# Patient Record
(demographics unavailable — no encounter records)

---

## 2024-10-07 NOTE — PHYSICAL EXAM
[Effusion] : effusion [Tracheostomy __ (size and type)] : tracheostomy [unfilled] [Normal] : no cervical lymphadenopathy [Increased Work of Breathing] : no increased work of breathing with use of accessory muscles and retractions [Normal Gait and Station] : abnormal gait and station [Normal muscle strength, symmetry and tone of facial, head and neck musculature] : abnormal muscle strength, symmetry and tone of facial, head and neck musculature [de-identified] : 5.0 Flextend Bivona uncuffed in place [de-identified] : wheelchair

## 2024-10-07 NOTE — PROCEDURE
[FreeTextEntry1] : Fiberoptic tracheoscopy  [FreeTextEntry2] : tracheostomy complication  [FreeTextEntry3] : distal tip sitting slightly posteriorly in airway.  No evidence of significant granulation or obstruction. Otherwise clear to venancio with some secretions.

## 2024-10-07 NOTE — REASON FOR VISIT
[Subsequent Evaluation] : a subsequent evaluation for [Patient] : patient [Mother] : mother [FreeTextEntry2] : tracheostomy related complications

## 2024-10-07 NOTE — HISTORY OF PRESENT ILLNESS
[No change in the review of systems as noted in prior visit date ___] : No change in the review of systems as noted in prior visit date of [unfilled] [de-identified] : 26 year old with hx of tracheostomy and ventilator dependence. 5.0 Pediatric Flextend uncuffed with a custom length of 55mm  Ciprodex once a day  Pulmicort BID  Doing well until our last visit  Symptoms started last week - SOB, more tired, Oxygen saturation 98% with and without o2 More secretions - multiple trach changes due to difficulty breathing.  Family trialed the 60mm trach tube but he was unable to speak and still felt uncomfortable  increased Ciprodex to BID 3 days ago.   Here today for evaluation

## 2024-10-11 NOTE — REASON FOR VISIT
[Follow-Up] : a follow-up visit [Shortness of Breath] : shortness of breath [Parent] : parent [TextBox_44] : Chronic Respiratory Failure

## 2024-10-11 NOTE — REVIEW OF SYSTEMS
[Cough] : cough [Dyspnea] : dyspnea [Wheezing] : wheezing [Edema] : edema [Focal Weakness] : focal weakness [Fever] : no fever [Fatigue] : no fatigue [Recent Wt Gain (___ Lbs)] : ~T no recent weight gain [Chills] : no chills [Epistaxis] : no epistaxis [Hemoptysis] : no hemoptysis [Chest Tightness] : no chest tightness [Frequent URIs] : no frequent URIs [Sputum] : no sputum [Pleuritic Pain] : no pleuritic pain [Chest Discomfort] : no chest discomfort [Leg Cramps] : no leg cramps [Palpitations] : no palpitations [Phlebitis] : no phlebitis [Watery Eyes] : no watery eyes [Itchy Eyes] : no itchy eyes [Angioedema] : no angioedema [Abdominal Pain] : no abdominal pain [Nausea] : no nausea [Vomiting] : no vomiting [Arthralgias] : no arthralgias [Myalgias] : no myalgias [Itch] : no itch [Dizziness] : no dizziness [Numbness] : no numbness [Anxiety] : no anxiety [Panic Attacks] : no panic attacks [Thyroid Problem] : no thyroid problem [TextBox_14] : Tracheostomy on vent [TextBox_30] : intermittent wheezing when laying down

## 2024-10-11 NOTE — HISTORY OF PRESENT ILLNESS
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  - Noted to be more SOB particularly when laying flat or leaning forward - mother has had to Ambu him a few times. She has also noted his dyspnea worsens with stress (particularly when she tells him she has to travel out of the country) - Recently saw Dr. Hutchison (ENT) and started on antibiotics and steroids for short course - Tracheoscopy did not reveal significant airway abnormality - being planned for custom trach and considered for more in-depth airway evaluation - Remains on mechanical ventilation with LTV vent but will likely need a new ventilator as this is being discontinued per patient's mother. They are requesting a prescription for a new ventilator (and a ResMed Astral if covered by insurance). They have not yet gotten this and hard copy of prescription given to patient during office visit today - Continue Budesonide and Ciprodex as per ENT for granulation tissue - Has not been using Albuterol despite noting dyspne and wheezing - No reported tracheal bleeding - Remains on full mechanical ventilation 24/300/9 - SHOULD REMAIN ON THESE SETTINGS - Unable to obtain ABG at prior visit and patient has deferred repeat attempts - This summer was [place]ING [place] - with his ventilator on a surfboard - Has home O2 but uses irregularly - saturations are good without reported desaturation events - Has a chest vest/wrap which they are using to help clear secretions - He is able to talk over the vent  From prior visit: He is mentating at baseline. He denies fevers or chills. He just finds it harder to breathe from time to time. He spends most of the day in his wheelchair. 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 He is able to talk over the ventilator without difficulty.   He follows with Peds ENT (Dr. Catarino Hutchison) due to tracheostomy irritation. He has a 5.0 cm custom tracheostomy tube of 60 mm in length.   He has a long cardiac history as well including MVP with moderate MR and partial anomalous pulmonary vein return (PAPVR) with a dilated left ventricle and mild reduction in LV function. He is due for cardiology followup and echocardiogram  Patient now may be the oldest living patients with Thanatrophic dysplasia. [de-identified] : Dyspnea [FreeTextEntry4] : Vance will continue [facility] discussions with his family. He stated in our office visit today (8/12/22) with his mother and cousin present that he would rather die on the street than go to the ED. He was somewhat joking but was indicating that he would like to avoid hospitalization at all costs.

## 2024-10-11 NOTE — PHYSICAL EXAM
[No Acute Distress] : no acute distress [Well Groomed] : well groomed [Supple] : supple [Normal Rate/Rhythm] : normal rate/rhythm [Normal Pulses] : normal pulses [Murmur ___ / 6] : murmur [unfilled] / 6 [No Resp Distress] : no resp distress [Clear to Auscultation Bilaterally] : clear to auscultation bilaterally [Scoliosis] : scoliosis [Benign] : benign [Soft] : soft [Deformity] : deformity [No Clubbing] : no clubbing [No Cyanosis] : no cyanosis [Normal Color/ Pigmentation] : normal color/ pigmentation [Oriented x3] : oriented x3 [Normal Mood] : normal mood [Normal Affect] : normal affect [No Acc Muscle Use] : no acc muscle use [TextBox_2] : Thanatrophic Dysplasia - wheelchair and vent dependent [TextBox_11] : tracheostomy in place, anicteric, EOMI, voice stable [TextBox_44] : no irritation around tracheostomy site [TextBox_54] : holosystolic murmur [TextBox_68] : no wheeze noted on exam today [TextBox_89] : abdomen soft but distended, +BS, no TTP  [TextBox_99] : Thanatrophic dysplagia - with associated dysmorphia/changes

## 2024-10-11 NOTE — ASSESSMENT
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   Vance may be the oldest living person with Thanotrophic dysplasia. Over the last few months he has had increasing shortness of breath of unclear etiology.  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 - recommend more consistent use of bronchodilators if wheezing or dyspnea - continue airway clearance with chest vest/wrap as able and as tolerated - ensure regular BM to avoid constipation - Presently euvolemic appearing - If Dr. Hutchison is unable to perform airway evaluation then will refer patient to adult IP for further investigations. I have sent Dr. Hutchison a message to discuss this further should it be needed.  #Chronic Respiratory Failure - requiring chronic mechanical ventilation. Patient deferred ABG attempt - Patient now on supplemental o2 via vent (2L) - Continue current vent settings with tidal volume 300cc and PEEP 9 - his Vent settings SHOULD NOT BE CHANGED, particularly not decreased, without ABG or clinical reasoning which was done in the past for unknown reason. Patient is requesting [facility] vent to [facility]. Order placed - Maintain O2 sat > 90%  - Discussed with patient the dangers associated with [place] his ventilator [place] the ocean - however he has indicated these are things that he wants to do understanding the risks. He is aware of the possibility of severe respiratory distress and/or death should the ventilator become dislodged, non-functional, or he have a serious aspiration event.  #Health Maintenance Spirometry: Unable to complete Smoking status: Never smoker  Pneumococcal vaccination status: Up to date [place] Sleepiness Scale: 0 (9/13/21) - patient on chronic vent   #Followup in 1-2 months or sooner as needed - Today's medical care services serve as the continuing focal point for needed health care services that are part of ongoing care related to a patient's one or more serious conditions or a complex condition. - Outpatient ventilator management ([facility] - new ventilator prescription provided again today) - Youtube Video with Vance's story: [email or web address]   I have discussed with Vance, and his mother, that a lot of what we are dealing with in regards to the potential progression of underlying lung disease related to his skeletal dysplasia is unchartered territory due to the rarity of this disease process. However, as Vance is awake, talking, and in good spirits on the ventilator we will continue the current treatment approaches with hopes for continued stability. We have discussed that if his clinical state acutely worsens then my recommendation would be evaluation in the hospital to allow for more acute management and treatment. Currently, Vance feels well and hospital evaluation and stay are not in line with what he wants given his current feelings. I have asked them to call my office immediately if any changes in his clinical status so that we can try and help however possible - whether requiring repeat imaging for adjustments of his ventilator  The above plan was discussed with Vance and his mother in detail. Patient verbalized understanding and agrees with plan as detailed above. Patient was provided education and counselling on current diagnosis/symptoms, diagnostic work up, treatment options and potential side effects of any prescribed therapy/therapies. They were advised to call our clinic at [phone] for any new or worsening symptoms, or with any questions or concerns. In case of acute onset of respiratory symptoms or worsening presentation, patient was advised to present to nearest emergency room for further evaluation. Vance expressed understanding and all questions/concerns were addressed.

## 2024-10-21 NOTE — PROCEDURE
[FreeTextEntry1] : Fiberoptic tracheoscopy and trach change  [FreeTextEntry2] : tracheostomy complication  [FreeTextEntry3] : Distal tip sits posteriorly to tracheal wall  No evidence of granulation tissue  Secretions at venancio- some erythema in trachea Distal tip is partially obstructive and otherwise clear to venancio   Tracheostomy changed by mother without difficulty 5.0 custom flextend at 58 mm length  Mild improvement at the distal tip - no evidence of granulation Patient was still able to vocalize

## 2024-10-21 NOTE — HISTORY OF PRESENT ILLNESS
[No change in the review of systems as noted in prior visit date ___] : No change in the review of systems as noted in prior visit date of [unfilled] [de-identified] : 26 year old with hx of tracheostomy and ventilator dependence. 5.0 Pediatric Flextend uncuffed with a custom length of 55mm  Ciprodex BID Pulmicort BID Treated with Augmentin and steroids for 5 days   Seen by Pulm  and CT ordered  Patient states to feel better but has pain when trach manipulated  Mother reports that less incidences of needing to use BVM Patients reports airleak at stoma site   Custom length of 58 ordered  Seen by cardio 6/2024   c/o of abdominal distention and severe constipation- No GI following

## 2024-10-21 NOTE — PHYSICAL EXAM
[Tracheostomy __ (size and type)] : tracheostomy [unfilled] [No Breakdown] : no evidence of breakdown or excoriation at stoma site [Normal] : no cervical lymphadenopathy [Exposed Vessel] : left anterior vessel not exposed [Increased Work of Breathing] : no increased work of breathing with use of accessory muscles and retractions [Normal Gait and Station] : abnormal gait and station [Normal muscle strength, symmetry and tone of facial, head and neck musculature] : abnormal muscle strength, symmetry and tone of facial, head and neck musculature [de-identified] : wheelchair, verbal and oriented  [de-identified] : 5.0  Peds Flextend 55mm

## 2024-10-23 NOTE — CARDIOLOGY SUMMARY
[Today's Date] : [unfilled] [FreeTextEntry1] : Low voltage EKG, normal sinus rhythm. Nonspecific T wave abnormality.  QTc 359. 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 Difficult to interpret z-scores given small body habitus.  Summary: 1. By history, LUPV draining into the LSVC, which could not be visualized on this study. 2. Bi-atrial enlargement. The left atrium is moderately dilated. 3. Dysplastic mitral valve, moderate mitral valve prolapse, redundant tissue of the anterior mitral valve leaflet is seen and thickened mitral valve. 4. Mild to moderate mitral valve regurgitation. 5. Mild tricuspid valve regurgitation, peak systolic instantaneous gradient 15.0 mmHg. 6. Dilated left ventricle with mild-moderately decreased LV systolic function. LVEF is approximate (51%) given sub-optimal parasternal images. 7. Qualitatively normal right ventricular systolic function. 8. Moderately dilated aortic root. 9. No pericardial effusion.

## 2024-10-23 NOTE — HISTORY OF PRESENT ILLNESS
[FreeTextEntry1] : I had the pleasure of seeing Vance Maldonado in the pediatric cardiology clinic on October 23, 2024; he was last seen on June 26, 2024.   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 He is on chronic ventilator support and wheelchair bound. He has mitral valve prolapse with regurgitation, PAPVR and decreased LV systolic function.   INTERVAL HISTORY:  10/23/24: Over the past year Vance has had persistently worsening SOB.  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  His ventilator settings have been optimized.  He recently had a Chest CT ordered by pulmonology, which showed no acute pulmonary process to explain the SOB.  Over this past week he had two episodes of shortness of breath that were significant.  Both came on suddenly, but resolved slowly.  He felt he had difficulty breathing like he was "drowning," felt his whole body felt numb, he was sweaty and very fatigued.  He has been sleeping more lately.  He has been taking his Lisinopril, digoxin and Lasix.  Lasix is once a day, which he takes at night.  He says he wakes up at 6:30 in the morning because he has to urinate.  HIs sister and mother brought up some concerns that he has trouble urinating - he will need to go, but can't go.   He continues on Digoxin 75 mcg daily, Lisinopril 5 mg daily and lasix 5 mg daily.  His currently weight is ~ 50 lbs, but fluctuates from day to day.  Chest CT (10/21/24): Grossly stable nonspecific central bronchial wall thickening with upper lobe predominance.  (Added by ECM: Diffuse mosaic attenuation of the lungs). Increased alveolar opacity in the left lower lobe which may represent pneumonia versus atelectasis. In comparison to prior CT chest imaging, there is increased ossification of the visualized axial skeleton with diffuse soft tissue infiltration of the osseous structures. This is consistent with known history of Thanatophoric Dysplasia.

## 2024-10-23 NOTE — DISCUSSION/SUMMARY
[May participate in all age-appropriate activities] : [unfilled] May participate in all age-appropriate activities. [Needs SBE Prophylaxis] : [unfilled] does not need bacterial endocarditis prophylaxis [FreeTextEntry1] : In summary, Vance is a 26-year-old young man with Thanatrophic Dwarfism type II, on chronic ventilator support (via tracheostomy) and wheelchair bound. His cardiac conditions include mitral valve prolapse with moderate regurgitation, PAPVR and dilated left ventricle with mildly decreased function.  He is on lisinopril and digoxin, with lasix re-added over the past 2 years.    He is having increasing SOB, which has been worsening over the past year.  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  His ventilator settings have been maximized.       Recommendations: 1. Obtain labs - CMP, BNP and CBC. Directions to [facility] lab given. 2. Increase Lasix to 5 mg BID 3. Continue the rest of his cardiac medications at current doses   - Digoxin - 75 mcg PO daily   - Lisonopril - 5 mg PO daily  4 Telehealth appointment on Friday November 1st at 11am.  Will consider increasing dose of Lasix at that time. 5. F/U in clinic in 3 months.  Letter given stating that he is cleared to travel via airplane.

## 2024-10-23 NOTE — REVIEW OF SYSTEMS
[Shortness Of Breath] : expressed as feeling short of breath [Feeling Poorly] : not feeling poorly (malaise) [Fever] : no fever [Wgt Loss (___ Lbs)] : no recent weight loss [Pallor] : not pale [Eye Discharge] : no eye discharge [Redness] : no redness [Change in Vision] : no change in vision [Nasal Stuffiness] : no nasal congestion [Sore Throat] : no sore throat [Earache] : no earache [Loss Of Hearing] : no hearing loss [Cyanosis] : no cyanosis [Edema] : no edema [Diaphoresis] : not diaphoretic [Chest Pain] : no chest pain or discomfort [Exercise Intolerance] : no persistence of exercise intolerance [Palpitations] : no palpitations [Orthopnea] : no orthopnea [Fast HR] : no tachycardia [Tachypnea] : not tachypneic [Wheezing] : no wheezing [Cough] : no cough [Vomiting] : no vomiting [Diarrhea] : no diarrhea [Abdominal Pain] : no abdominal pain [Decrease In Appetite] : appetite not decreased [Fainting (Syncope)] : no fainting [Seizure] : no seizures [Headache] : no headache [Dizziness] : no dizziness [Joint Pains] : no arthralgias [Joint Swelling] : no joint swelling [Rash] : no rash [Wound problems] : no wound problems [Easy Bruising] : no tendency for easy bruising [Swollen Glands] : no lymphadenopathy [Easy Bleeding] : no ~M tendency for easy bleeding [Nosebleeds] : no epistaxis [Sleep Disturbances] : ~T no sleep disturbances [Hyperactive] : no hyperactive behavior [Depression] : no depression [Anxiety] : no anxiety [Failure To Thrive] : no failure to thrive [Short Stature] : short stature was not noted [Jitteriness] : no jitteriness [Heat/Cold Intolerance] : no temperature intolerance [Dec Urine Output] : no oliguria [FreeTextEntry1] : Wheelchair bound. Tracheostomy + ventilator

## 2024-10-23 NOTE — CONSULT LETTER
[Today's Date] : [unfilled] [Name] : Name: [unfilled] [] : : ~~ [Today's Date:] : [unfilled] [Dear  ___:] : Dear Dr. [unfilled]: [Consult] : I had the pleasure of evaluating your patient, [unfilled]. My full evaluation follows. [Consult - Single Provider] : Thank you very much for allowing me to participate in the care of this patient. If you have any questions, please do not hesitate to contact me. [Sincerely,] : Sincerely, [de-identified] : Shira Villarreal MD Attending Pediatric Cardiologist  [facility] [phone] [email or web address]

## 2024-10-23 NOTE — PHYSICAL EXAM
[General Appearance - Alert] : alert [General Appearance - In No Acute Distress] : in no acute distress [General Appearance - Well Nourished] : well nourished [General Appearance - Well-Appearing] : well appearing [Sclera] : the conjunctiva were normal [Outer Ear] : the ears and nose were normal in appearance [Examination Of The Oral Cavity] : mucous membranes were moist and pink [Auscultation Breath Sounds / Voice Sounds] : breath sounds clear to auscultation bilaterally [Normal Chest Appearance] : the chest was normal in appearance [Apical Impulse] : quiet precordium with normal apical impulse [Heart Rate And Rhythm] : normal heart rate and rhythm [Heart Sounds] : normal S1 and S2 [Heart Sounds Gallop] : no gallops [Heart Sounds Pericardial Friction Rub] : no pericardial rub [Edema] : no edema [Arterial Pulses] : normal upper and lower extremity pulses with no pulse delay [Heart Sounds Click] : no clicks [Capillary Refill Test] : normal capillary refill [Systolic] : systolic [II] : a grade 2/6 [Apical] : apex [Blowing] : blowing [Bowel Sounds] : normal bowel sounds [Abdomen Soft] : soft [Nondistended] : nondistended [Abdomen Tenderness] : non-tender [Nail Clubbing] : no clubbing  or cyanosis of the fingers [Motor Tone] : normal muscle strength and tone [Cervical Lymph Nodes Enlarged Anterior] : The anterior cervical nodes were normal [Cervical Lymph Nodes Enlarged Posterior] : The posterior cervical nodes were normal [] : no rash [Skin Lesions] : no lesions [Skin Turgor] : normal turgor [Demonstrated Behavior - Infant Nonreactive To Parents] : interactive [Mood] : mood and affect were appropriate for age [Demonstrated Behavior] : normal behavior [FreeTextEntry1] : Thenatrophic Dysplasia [FreeTextEntry2] : Macrocephaly, dysmorphic features [FreeTextEntry4] : +tracheostomy

## 2024-10-24 NOTE — REASON FOR VISIT
[Home] : at home, [unfilled] , at the time of the visit. [Medical Office: ([facility])___] : at the medical office located in  [Patient] : the patient [Self] : self [Follow-Up] : a follow-up visit [Shortness of Breath] : shortness of breath [Family Member] : family member [Other: _____] : [unfilled]

## 2024-10-24 NOTE — HISTORY OF PRESENT ILLNESS
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  - TEB done with patient and sister - Continues to have intermittent shortness of breath  - Had CT chest done recently which was reviewed - concern for overall progression of disease  - Currently using Duoneb BID and no longer on Pulmicort - Recently had trach change to see if this improves his symptoms - but he feels that it has not. He feels the breathing is more difficult and his ability to phonate is less - Remains on mechanical ventilation with LTV vent - at last visit given prescription for a new ventilator at their request. Awaiting further word from DME - No reported tracheal bleeding - Remains on full mechanical ventilation 24/300/9 - SHOULD REMAIN ON THESE SETTINGS - This summer was [place] - with his ventilator on a surfboard - Has a chest vest/wrap which they are using to help clear secretions - Ability to phonate over vent is decreased with new tracheostomy which is longer  From prior visit: He is mentating at baseline. He denies fevers or chills. He just finds it harder to breathe from time to time. He spends most of the day in his wheelchair. 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 He is able to talk over the ventilator without difficulty.   He follows with Peds ENT (Dr. Catarino Hutchison) due to tracheostomy irritation. He has a 5.0 cm custom tracheostomy tube of 60 mm in length.   He has a long cardiac history as well including MVP with moderate MR and partial anomalous pulmonary vein return (PAPVR) with a dilated left ventricle and mild reduction in LV function. He is due for cardiology followup and echocardiogram  Patient now may be the oldest living patients with Thanatrophic dysplasia. [de-identified] : Dyspnea [TextBox_12] : 10/17/24 - LUNGS AND LARGE AIRWAYS: Partially visualized tracheostomy terminating above the venancio. The central airways are patent. There is central bronchial wall thickening with bilateral upper lobe predominance, grossly stable from prior. Increased alveolar opacity in the left lower lobe. Diffuse mosaic attenuation of the lungs. Scattered subsegmental atelectasis. PLEURA: No pleural effusion. VESSELS: Within normal limits. HEART: Heart size is normal. Small pericardial effusion. MEDIASTINUM AND VINICIO: Residual thymic tissue. No lymphadenopathy. CHEST WALL AND LOWER NECK: Bell-shaped thorax. VISUALIZED UPPER ABDOMEN: Within normal limits. BONES: In comparison to prior CT imaging, there is increased ossification of the visualized axial skeleton with diffuse soft tissue infiltration of the osseous structures such as the ribs, vertebral bodies, and partially visualized humeri.  IMPRESSION:  Grossly stable nonspecific central bronchial wall thickening with upper lobe predominance.  Increased alveolar opacity in the left lower lobe which may represent pneumonia versus atelectasis.  In comparison to prior CT chest imaging, there is increased ossification of the visualized axial skeleton with diffuse soft tissue infiltration of the osseous structures. This is consistent with known history of Thanatophoric Dysplasia. [FreeTextEntry4] : Vance will continue GOC discussions with his family. He stated in our office visit today (8/12/22) with his mother and cousin present that he would rather die on the street than go to the ED. He was somewhat joking but was indicating that he would like to avoid hospitalization at all costs.  Repeat discussion of GOC with patient and sister (Malika) during TEB visit 10/24/24. We discussed CT findings of increased ossification of the visualized axial skeleton with diffuse soft tissue infiltration of the osseous structures

## 2024-10-24 NOTE — ASSESSMENT
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   Vance may be the oldest living person with Thanotrophic dysplasia. Over the last few months he has had increasing shortness of breath of unclear etiology.  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 Hutchison - CT chest reviewed with patient and sister today - He does not have typical air hunger symptoms. - Will start Budesonide BID and increase Duoneb to QID for the time being and monitor if this leads to improvement - continue airway clearance with chest vest/wrap as able and as tolerated - ensure regular BM to avoid constipation - Recently started on Lasix in case volume overload is contributing - If Dr. Hutchison is unable to perform airway evaluation then will refer patient to adult IP for further investigations. - I am concerned that patient's dyspnea may in part be related to progression of his disease process and have had a discussion with patient and sister today about a discussion amongst the family regarding Vance's long term goals to ensure treatment options are aimed at maintaining, and hopefully improving, his quality of life which is currently his primary goal  #Chronic Respiratory Failure - requiring chronic mechanical ventilation and serum bicarb NOT elevated 10/23/24 - Patient now on supplemental o2 via vent (2L) - Continue current vent settings with tidal volume 300cc and PEEP 9 - his Vent settings SHOULD NOT BE CHANGED, particularly not decreased, without ABG or clinical reasoning which was done in the past for unknown reason.  - Recent blood work with Dr. Villarreal note a low serum bicarb - it is unlikely that he is hypercapnic based on this blood work - Maintain O2 sat > 90%  - Patient recently went surfing with his ventilator (put a video up on his [employer] account). Patient is aware of the dangers related to this but it was important for his QoL to do this.  #Health Maintenance Spirometry: Unable to complete Smoking status: Never smoker  Pneumococcal vaccination status: Up to date [place] Sleepiness Scale: 0 (9/13/21) - patient on chronic vent   #Followup in 1-2 months or sooner as needed - Today's medical care services serve as the continuing focal point for needed health care services that are part of ongoing care related to a patient's one or more serious conditions or a complex condition. - Outpatient ventilator management ([facility] - new ventilator prescription provided again today) - Youtube Video with Vance's story: [email or web address]   I have discussed with Vance, and his sister, that a lot of what we are dealing with in regards to the potential progression of underlying lung disease related to his skeletal dysplasia is unchartered territory due to the rarity of this disease process. However, as Vance is awake, talking, and in good spirits on the ventilator we will continue the current treatment approaches with hopes for continued stability. We have discussed that if his clinical state acutely worsens then my recommendation would be evaluation in the hospital to allow for more acute management and treatment. Currently, Vance feels well and hospital evaluation and stay are not in line with what he wants given his current feelings. I have asked them to call my office immediately if any changes in his clinical status so that we can try and help however possible - whether requiring repeat imaging for adjustments of his ventilator  The above plan was discussed with Vance in detail. Patient verbalized understanding and agrees with plan as detailed above. Patient was provided education and counselling on current diagnosis/symptoms, diagnostic work up, treatment options and potential side effects of any prescribed therapy/therapies. They were advised to call our clinic at [phone] for any new or worsening symptoms, or with any questions or concerns. In case of acute onset of respiratory symptoms or worsening presentation, patient was advised to present to nearest emergency room for further evaluation. Vance expressed understanding and all questions/concerns were addressed.

## 2024-10-24 NOTE — ASSESSMENT
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   Vance may be the oldest living person with Thanotrophic dysplasia. Over the last few months he has had increasing shortness of breath of unclear etiology.  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 Hutchison - CT chest reviewed with patient and sister today - He does not have typical air hunger symptoms. - Will start Budesonide BID and increase Duoneb to QID for the time being and monitor if this leads to improvement - continue airway clearance with chest vest/wrap as able and as tolerated - ensure regular BM to avoid constipation - Recently started on Lasix in case volume overload is contributing - If Dr. Hutchison is unable to perform airway evaluation then will refer patient to adult IP for further investigations. - I am concerned that patient's dyspnea may in part be related to progression of his disease process and have had a discussion with patient and sister today about a discussion amongst the family regarding Vance's long term goals to ensure treatment options are aimed at maintaining, and hopefully improving, his quality of life which is currently his primary goal  #Chronic Respiratory Failure - requiring chronic mechanical ventilation and serum bicarb NOT elevated 10/23/24 - Patient now on supplemental o2 via vent (2L) - Continue current vent settings with tidal volume 300cc and PEEP 9 - his Vent settings SHOULD NOT BE CHANGED, particularly not decreased, without ABG or clinical reasoning which was done in the past for unknown reason.  - Recent blood work with Dr. Villarreal note a low serum bicarb - it is unlikely that he is hypercapnic based on this blood work - Maintain O2 sat > 90%  - Patient recently went surfing with his ventilator (put a video up on his "[employer]agram account). Patient is aware of the dangers related to this but it was important for his QoL to do this.  #Health Maintenance Spirometry: Unable to complete Smoking status: Never smoker  Pneumococcal vaccination status: Up to date [place] Sleepiness Scale: 0 (9/13/21) - patient on chronic vent   #Followup in 1-2 months or sooner as needed - Today's medical care services serve as the continuing focal point for needed health care services that are part of ongoing care related to a patient's one or more serious conditions or a complex condition. - Outpatient ventilator management ([facility] - new ventilator prescription provided again today) - Youtube Video with Vance's story: [email or web address]   I have discussed with Vance, and his sister, that a lot of what we are dealing with in regards to the potential progression of underlying lung disease related to his skeletal dysplasia is unchartered territory due to the rarity of this disease process. However, as Vance is awake, talking, and in good spirits on the ventilator we will continue the current treatment approaches with hopes for continued stability. We have discussed that if his clinical state acutely worsens then my recommendation would be evaluation in the hospital to allow for more acute management and treatment. Currently, Vance feels well and hospital evaluation and stay are not in line with what he wants given his current feelings. I have asked them to call my office immediately if any changes in his clinical status so that we can try and help however possible - whether requiring repeat imaging for adjustments of his ventilator  The above plan was discussed with Vance in detail. Patient verbalized understanding and agrees with plan as detailed above. Patient was provided education and counselling on current diagnosis/symptoms, diagnostic work up, treatment options and potential side effects of any prescribed therapy/therapies. They were advised to call our clinic at [phone] for any new or worsening symptoms, or with any questions or concerns. In case of acute onset of respiratory symptoms or worsening presentation, patient was advised to present to nearest emergency room for further evaluation. Vance expressed understanding and all questions/concerns were addressed.

## 2024-10-24 NOTE — REASON FOR VISIT
[Home] : at home, [unfilled] , at the time of the visit. [Medical Office: ([facility])___] : at the medical office located in  [Patient] : the patient [Self] : self [Follow-Up] : a follow-up visit [Shortness of Breath] : shortness of breath [Family Member] : family member [Other: _____] : [unfilled]

## 2024-10-24 NOTE — HISTORY OF PRESENT ILLNESS
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  - TEB done with patient and sister - Continues to have intermittent shortness of breath  - Had CT chest done recently which was reviewed - concern for overall progression of disease  - Currently using Duoneb BID and no longer on Pulmicort - Recently had trach change to see if this improves his symptoms - but he feels that it has not. He feels the breathing is more difficult and his ability to phonate is less - Remains on mechanical ventilation with LTV vent - at last visit given prescription for a new ventilator at their request. Awaiting further word from DME - No reported tracheal bleeding - Remains on full mechanical ventilation 24/300/9 - SHOULD REMAIN ON THESE SETTINGS - This [place] - with his ventilator on a surfboard - Has a chest vest/wrap which they are using to help clear secretions - Ability to phonate over vent is decreased with new tracheostomy which is longer  From prior visit: He is mentating at baseline. He denies fevers or chills. He just finds it harder to breathe from time to time. He spends most of the day in his wheelchair. 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 He is able to talk over the ventilator without difficulty.   He follows with Peds ENT (Dr. Catarino Hutchison) due to tracheostomy irritation. He has a 5.0 cm custom tracheostomy tube of 60 mm in length.   He has a long cardiac history as well including MVP with moderate MR and partial anomalous pulmonary vein return (PAPVR) with a dilated left ventricle and mild reduction in LV function. He is due for cardiology followup and echocardiogram  Patient now may be the oldest living patients with Thanatrophic dysplasia. [de-identified] : Dyspnea [TextBox_12] : 10/17/24 - LUNGS AND LARGE AIRWAYS: Partially visualized tracheostomy terminating above the venancio. The central airways are patent. There is central bronchial wall thickening with bilateral upper lobe predominance, grossly stable from prior. Increased alveolar opacity in the left lower lobe. Diffuse mosaic attenuation of the lungs. Scattered subsegmental atelectasis. PLEURA: No pleural effusion. VESSELS: Within normal limits. HEART: Heart size is normal. Small pericardial effusion. MEDIASTINUM AND VINICIO: Residual thymic tissue. No lymphadenopathy. CHEST WALL AND LOWER NECK: Bell-shaped thorax. VISUALIZED UPPER ABDOMEN: Within normal limits. BONES: In comparison to prior CT imaging, there is increased ossification of the visualized axial skeleton with diffuse soft tissue infiltration of the osseous structures such as the ribs, vertebral bodies, and partially visualized humeri.  IMPRESSION:  Grossly stable nonspecific central bronchial wall thickening with upper lobe predominance.  Increased alveolar opacity in the left lower lobe which may represent pneumonia versus atelectasis.  In comparison to prior CT chest imaging, there is increased ossification of the visualized axial skeleton with diffuse soft tissue infiltration of the osseous structures. This is consistent with known history of Thanatophoric Dysplasia. [FreeTextEntry4] : Vance will continue GOC discussions with his family. He stated in our office visit today (8/12/22) with his mother and cousin present that he would rather die on the street than go to the ED. He was somewhat joking but was indicating that he would like to avoid hospitalization at all costs.  Repeat discussion of GOC with patient and sister (Malika) during TEB visit 10/24/24. We discussed CT findings of increased ossification of the visualized axial skeleton with diffuse soft tissue infiltration of the osseous structures

## 2024-10-24 NOTE — REVIEW OF SYSTEMS
[Cough] : cough [Dyspnea] : dyspnea [Fever] : no fever [Hemoptysis] : no hemoptysis [Chest Tightness] : no chest tightness [Frequent URIs] : no frequent URIs [Wheezing] : no wheezing [Chest Discomfort] : no chest discomfort [Edema] : no edema [Abdominal Pain] : no abdominal pain [Nausea] : no nausea [Vomiting] : no vomiting [Headache] : no headache [Focal Weakness] : no focal weakness [Head Injury] : no head injury

## 2025-01-29 NOTE — CONSULT LETTER
[Today's Date] : [unfilled] [Name] : Name: [unfilled] [] : : ~~ [Today's Date:] : [unfilled] [Dear  ___:] : Dear Dr. [unfilled]: [Consult] : I had the pleasure of evaluating your patient, [unfilled]. My full evaluation follows. [Consult - Single Provider] : Thank you very much for allowing me to participate in the care of this patient. If you have any questions, please do not hesitate to contact me. [Sincerely,] : Sincerely, [FreeTextEntry4] : Moses Gilbert MD [FreeTextEntry5] :  [address], [place] [address] [FreeTextEntry6] :  [phone]

## 2025-01-29 NOTE — CARDIOLOGY SUMMARY
[Today's Date] : [unfilled] [FreeTextEntry1] : Low voltage EKG, normal sinus rhythm. Nonspecific T wave abnormality.  QTc 436.

## 2025-01-29 NOTE — HISTORY OF PRESENT ILLNESS
[FreeTextEntry1] : I had the pleasure of seeing Vance Maldonado in the pediatric cardiology clinic on October 23, 2024; he was last seen on June 26, 2024.   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 He is on chronic ventilator support and wheelchair bound. He has mitral valve prolapse with regurgitation, PAPVR and decreased LV systolic function.   INTERVAL HISTORY:  10/23/24: Over the past year Vance has had persistently worsening SOB.  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  His ventilator settings have been optimized.  He recently had a Chest CT ordered by pulmonology, which showed no acute pulmonary process to explain the SOB.  Over this past week he had two episodes of shortness of breath that were significant.  Both came on suddenly, but resolved slowly.  He felt he had difficulty breathing like he was "drowning," felt his whole body felt numb, he was sweaty and very fatigued.  He has been sleeping more lately.  He has been taking his Lisinopril, digoxin and Lasix.  Lasix is once a day, which he takes at night.  He says he wakes up at 6:30 in the morning because he has to urinate.  HIs sister and mother brought up some concerns that he has trouble urinating - he will need to go, but can't go.   He continues on Digoxin 75 mcg daily, Lisinopril 5 mg daily and lasix 5 mg daily.  His currently weight is ~ 50 lbs, but fluctuates from day to day.  *** Syncope frequently - Zio patch placed No preceeding palpitations or chest pain, sometimes symptoms of tingling in the legs/feet. Currently with URI symptoms, increased secretions cough Chest pain with cough and suctioning Swelling of right him and LE.   No recent issues with breathing (other than URI) - no increased WOB, SOB, vent changes, etc. Most days only getting lasix once a day.  urinating a lot throughout the day.  Drinking 2-4 large glasses of water per day.  Chest CT (10/21/24): Grossly stable nonspecific central bronchial wall thickening with upper lobe predominance.  (Added by ECM: Diffuse mosaic attenuation of the lungs). Increased alveolar opacity in the left lower lobe which may represent pneumonia versus atelectasis. In comparison to prior CT chest imaging, there is increased ossification of the visualized axial skeleton with diffuse soft tissue infiltration of the osseous structures. This is consistent with known history of Thanatophoric Dysplasia.

## 2025-01-29 NOTE — DISCUSSION/SUMMARY
[May participate in all age-appropriate activities] : [unfilled] May participate in all age-appropriate activities. [Needs SBE Prophylaxis] : [unfilled] does not need bacterial endocarditis prophylaxis [FreeTextEntry1] : In summary, Vance is a 26-year-old young man with Thanatrophic Dwarfism type II, on chronic ventilator support (via tracheostomy) and wheelchair bound. His cardiac conditions include mitral valve prolapse with moderate regurgitation, PAPVR and dilated left ventricle with mildly decreased function.  He is on lisinopril and digoxin, with lasix re-added over the past 2 years.    He is having increasing SOB, which has been worsening over the past year.  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  His ventilator settings have been maximized.       UD Recommendations: 1. Holter (Zio) monitor to capture heart rhythm at the time of syncope 2. Continue lasix at once a day (gutierrez had not been giving it BID) 3. Continue the rest of his cardiac medications at current doses   - Digoxin - 75 mcg PO daily   - Lisonopril - 5 mg PO daily  4 Telehealth appointment on Friday November 1st at 11am.  Will consider increasing dose of Lasix at that time. 5. F/U in clinic in 3 months.  Letter given stating that he is cleared to travel via airplane.

## 2025-05-07 NOTE — CONSULT LETTER
[Today's Date] : [unfilled] [Name] : Name: [unfilled] [] : : ~~ [Today's Date:] : [unfilled] [Dear  ___:] : Dear Dr. [unfilled]: [Consult - Single Provider] : Thank you very much for allowing me to participate in the care of this patient. If you have any questions, please do not hesitate to contact me. [Sincerely,] : Sincerely, [FreeTextEntry4] : [facility] [FreeTextEntry5] : [address], [place] [address] [FreeTextEntry6] :  [phone]

## 2025-05-07 NOTE — REVIEW OF SYSTEMS
[Feeling Poorly] : not feeling poorly (malaise) [Fever] : no fever [Wgt Loss (___ Lbs)] : no recent weight loss [Pallor] : not pale [Eye Discharge] : no eye discharge [Redness] : no redness [Change in Vision] : no change in vision [Nasal Stuffiness] : no nasal congestion [Sore Throat] : no sore throat [Earache] : no earache [Loss Of Hearing] : no hearing loss [Cyanosis] : no cyanosis [Edema] : no edema [Diaphoresis] : not diaphoretic [Chest Pain] : no chest pain or discomfort [Exercise Intolerance] : no persistence of exercise intolerance [Palpitations] : no palpitations [Orthopnea] : no orthopnea [Fast HR] : no tachycardia [Tachypnea] : not tachypneic [Wheezing] : no wheezing [Cough] : no cough [Shortness Of Breath] : expressed as feeling short of breath [Vomiting] : no vomiting [Diarrhea] : no diarrhea [Abdominal Pain] : no abdominal pain [Decrease In Appetite] : appetite not decreased [Fainting (Syncope)] : no fainting [Seizure] : no seizures [Headache] : no headache [Dizziness] : no dizziness [Joint Pains] : no arthralgias [Joint Swelling] : no joint swelling [Rash] : no rash [Wound problems] : no wound problems [Easy Bruising] : no tendency for easy bruising [Swollen Glands] : no lymphadenopathy [Easy Bleeding] : no ~M tendency for easy bleeding [Nosebleeds] : no epistaxis [Sleep Disturbances] : ~T no sleep disturbances [Hyperactive] : no hyperactive behavior [Depression] : no depression [Anxiety] : no anxiety [Failure To Thrive] : no failure to thrive [Short Stature] : short stature was not noted [Jitteriness] : no jitteriness [Heat/Cold Intolerance] : no temperature intolerance [Dec Urine Output] : no oliguria [FreeTextEntry1] : Wheelchair bound. Tracheostomy + ventilator

## 2025-05-07 NOTE — CARDIOLOGY SUMMARY
[Today's Date] : [unfilled] [FreeTextEntry1] : Very low voltage EKG, normal sinus rhythm. Nonspecific T wave abnormality.  QTc 406.

## 2025-05-07 NOTE — HISTORY OF PRESENT ILLNESS
[FreeTextEntry1] : I had the pleasure of seeing Vance Maldonado in the pediatric cardiology clinic on May 7, 2025; he was last seen on January 29, 2025.  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 He is on chronic ventilator support and wheelchair bound. He has mitral valve prolapse with regurgitation, PAPVR and decreased LV systolic function.   INTERVAL HISTORY:  5/7/25 - palpitations + occasional syncope. ePatch had been sent to the home in March, but family did not put on yet. Palpitations - sudden onset/ offset, feels like his heart is pounding harder and faster.  No associated Syncope. Syncope occurs at random, but has also occurred with suctioning trach and when getting angry.  Syncope has no preceeding symptoms.  Family also notes that he seems to urinate better when he is not taking the Lasix.  When he does take lasix, he has to urinate more often and just a little bit.  Swelling of arms and legs, more so compared to prior visit.  He continues on Digoxin 75 mcg daily, Lisinopril 5 mg daily and lasix 5 mg daily.  His currently weight is ~ 50 lbs, but fluctuates from day to day.  *** Chest CT (10/21/24): Grossly stable nonspecific central bronchial wall thickening with upper lobe predominance.  (Added by ECM: Diffuse mosaic attenuation of the lungs). Increased alveolar opacity in the left lower lobe which may represent pneumonia versus atelectasis. In comparison to prior CT chest imaging, there is increased ossification of the visualized axial skeleton with diffuse soft tissue infiltration of the osseous structures. This is consistent with known history of Thanatophoric Dysplasia.

## 2025-05-07 NOTE — DISCUSSION/SUMMARY
[Needs SBE Prophylaxis] : [unfilled] does not need bacterial endocarditis prophylaxis [May participate in all age-appropriate activities] : [unfilled] May participate in all age-appropriate activities. 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 His cardiac conditions include mitral valve prolapse with moderate regurgitation, PAPVR and dilated left ventricle with mildly decreased function.  He is on lisinopril and digoxin, with lasix re-added over the past 2 years.    Recently Vance has been having syncope - sometimes while otherwise normal (resting, not straining), and other times when he is angry.  It is unclear if this is related to increased vagal tone vs. bradyarrhythmia.  He is having no preceeding symptoms.     UD Recommendations: 1. Holter (ePatch) monitor to capture heart rhythm at the time of syncope 2. Continue lasix once a day (gutierrez had not been giving it BID) - consider holding dose for 5-7 days to see if urination is better, monitor edema. 3. Continue the rest of his cardiac medications at current doses   - Digoxin - 75 mcg PO daily   - Lisonopril - 5 mg PO daily  4 F/U in clinic in 3 months.